# Patient Record
Sex: FEMALE | Race: WHITE | HISPANIC OR LATINO | Employment: UNEMPLOYED | URBAN - METROPOLITAN AREA
[De-identification: names, ages, dates, MRNs, and addresses within clinical notes are randomized per-mention and may not be internally consistent; named-entity substitution may affect disease eponyms.]

---

## 2022-05-17 ENCOUNTER — GYNECOLOGY VISIT (OUTPATIENT)
Dept: OBGYN | Facility: CLINIC | Age: 22
End: 2022-05-17

## 2022-05-17 VITALS
HEIGHT: 65 IN | BODY MASS INDEX: 26.49 KG/M2 | WEIGHT: 159 LBS | SYSTOLIC BLOOD PRESSURE: 108 MMHG | DIASTOLIC BLOOD PRESSURE: 64 MMHG

## 2022-05-17 DIAGNOSIS — Z32.01 POSITIVE PREGNANCY TEST: ICD-10-CM

## 2022-05-17 DIAGNOSIS — N93.8 DUB (DYSFUNCTIONAL UTERINE BLEEDING): ICD-10-CM

## 2022-05-17 LAB
INT CON NEG: POSITIVE
INT CON POS: POSITIVE
POC URINE PREGNANCY TEST: POSITIVE

## 2022-05-17 PROCEDURE — 99203 OFFICE O/P NEW LOW 30 MIN: CPT | Mod: 25 | Performed by: OBSTETRICS & GYNECOLOGY

## 2022-05-17 PROCEDURE — 81025 URINE PREGNANCY TEST: CPT | Performed by: OBSTETRICS & GYNECOLOGY

## 2022-05-17 PROCEDURE — 76830 TRANSVAGINAL US NON-OB: CPT | Mod: TC | Performed by: OBSTETRICS & GYNECOLOGY

## 2022-05-17 NOTE — PROGRESS NOTES
"CC: Confirmation of Pregnancy    HPI: Pt is a 23 yo   lmp 3/10/22 who presents for evaluation of amenorrhea.  A urine pregnancy test was positive.  She complains of some vaginal spotting.  She denies pelvic pain.  She notes nausea and vomiting.      History reviewed. No pertinent past medical history.    History reviewed. No pertinent surgical history.    No current outpatient medications on file.    Patient has no known allergies.    History reviewed. No pertinent family history.        OB History    Para Term  AB Living   1 0 0 0 0 0   SAB IAB Ectopic Molar Multiple Live Births   0 0 0 0 0 0       ROS: negative for dizziness, SOB, chest pain, palpitations, dysuria, vaginal discharge.    /64 (BP Location: Left arm, Patient Position: Sitting, BP Cuff Size: Adult)   Ht 1.65 m (5' 4.96\")   Wt 72.1 kg (159 lb)     GENERAL: Alert, in no apparent distress  PSYCHIATRIC: Appropriate affect, intact insight and judgement.  ABDOMEN: Soft, nontender, nondistended.  No palpable masses. No hepatosplenomegaly.   No rebound or guarding.  No inguinal lymphadenopathy.  BACK: No CVA tenderness  EXTREMITIES: No edema, no calf tenderness.    GENITOURINARY:  Normal external genitalia, no lesions.  Normal urethral meatus, no masses or tenderness.  Normal bladder without fullness or masses.  Vagina well estrogenized, no vaginal discharge or lesions.  No blood noted.  Cervix normal length, nontender.  A small ectropion is present.  No active bleeding is noted.  Uterus 10 weeks,  nontender.  Adnexa nontender, no masses.  Normal anus and perineum.      TRANSVAGINAL ULTRASOUND - performed and interpreted by me    Single gestational sac present.  Yolk sac present.     [place] intrauterine pregnancy with CRL measuring 3.14 cm, c/w 10+0/7 weeks EGA, positive fetal cardiac activity noted.  Fhts 164 bpm.  EDC 22.     No fluid in cul de sac.    Ovaries and cervix appear grossly normal. Cervical length = 3.27 " rowena.        ASSESSMENT/PLAN:  DUB visit - [place] IUP at 10+0/7 wks. TRISTEN will be 12/15/2022 by LMP consistent with ultrasound today.  Prenatal Vitamins.  F/U for NOB appointment 2 weeks.

## 2022-05-26 ENCOUNTER — APPOINTMENT (OUTPATIENT)
Dept: OBGYN | Facility: CLINIC | Age: 22
End: 2022-05-26

## 2022-06-06 ENCOUNTER — HOSPITAL ENCOUNTER (OUTPATIENT)
Dept: LAB | Facility: MEDICAL CENTER | Age: 22
End: 2022-06-06
Attending: NURSE PRACTITIONER
Payer: COMMERCIAL

## 2022-06-06 ENCOUNTER — HOSPITAL ENCOUNTER (OUTPATIENT)
Facility: MEDICAL CENTER | Age: 22
End: 2022-06-06
Attending: NURSE PRACTITIONER
Payer: COMMERCIAL

## 2022-06-06 ENCOUNTER — INITIAL PRENATAL (OUTPATIENT)
Dept: OBGYN | Facility: CLINIC | Age: 22
End: 2022-06-06

## 2022-06-06 VITALS
SYSTOLIC BLOOD PRESSURE: 102 MMHG | BODY MASS INDEX: 26.99 KG/M2 | WEIGHT: 162 LBS | HEIGHT: 65 IN | DIASTOLIC BLOOD PRESSURE: 60 MMHG

## 2022-06-06 DIAGNOSIS — Z34.01 SUPERVISION OF NORMAL FIRST PREGNANCY IN FIRST TRIMESTER: Primary | ICD-10-CM

## 2022-06-06 DIAGNOSIS — Z34.01 SUPERVISION OF NORMAL FIRST PREGNANCY IN FIRST TRIMESTER: ICD-10-CM

## 2022-06-06 DIAGNOSIS — O23.41 UTI IN PREGNANCY, ANTEPARTUM, FIRST TRIMESTER: ICD-10-CM

## 2022-06-06 DIAGNOSIS — O23.41 URINARY TRACT INFECTION IN MOTHER DURING FIRST TRIMESTER OF PREGNANCY: ICD-10-CM

## 2022-06-06 LAB
ABO GROUP BLD: NORMAL
AMORPH CRY #/AREA URNS HPF: PRESENT /HPF
AMPHET UR QL SCN: NEGATIVE
APPEARANCE UR: ABNORMAL
APPEARANCE UR: NORMAL
BACTERIA #/AREA URNS HPF: ABNORMAL /HPF
BARBITURATES UR QL SCN: NEGATIVE
BASOPHILS # BLD AUTO: 0.5 % (ref 0–1.8)
BASOPHILS # BLD: 0.05 K/UL (ref 0–0.12)
BENZODIAZ UR QL SCN: NEGATIVE
BILIRUB UR QL STRIP.AUTO: NEGATIVE
BILIRUB UR STRIP-MCNC: NORMAL MG/DL
BLD GP AB SCN SERPL QL: NORMAL
BZE UR QL SCN: NEGATIVE
CANNABINOIDS UR QL SCN: NEGATIVE
COLOR UR AUTO: NORMAL
COLOR UR: YELLOW
EOSINOPHIL # BLD AUTO: 0.15 K/UL (ref 0–0.51)
EOSINOPHIL NFR BLD: 1.5 % (ref 0–6.9)
EPI CELLS #/AREA URNS HPF: ABNORMAL /HPF
ERYTHROCYTE [DISTWIDTH] IN BLOOD BY AUTOMATED COUNT: 47 FL (ref 35.9–50)
GLUCOSE UR STRIP.AUTO-MCNC: NEGATIVE MG/DL
GLUCOSE UR STRIP.AUTO-MCNC: NEGATIVE MG/DL
HBV SURFACE AG SER QL: ABNORMAL
HCT VFR BLD AUTO: 38.7 % (ref 37–47)
HGB BLD-MCNC: 12.7 G/DL (ref 12–16)
HIV 1+2 AB+HIV1 P24 AG SERPL QL IA: NORMAL
HYALINE CASTS #/AREA URNS LPF: ABNORMAL /LPF
IMM GRANULOCYTES # BLD AUTO: 0.04 K/UL (ref 0–0.11)
IMM GRANULOCYTES NFR BLD AUTO: 0.4 % (ref 0–0.9)
KETONES UR STRIP.AUTO-MCNC: NEGATIVE MG/DL
KETONES UR STRIP.AUTO-MCNC: NEGATIVE MG/DL
LEUKOCYTE ESTERASE UR QL STRIP.AUTO: NEGATIVE
LEUKOCYTE ESTERASE UR QL STRIP.AUTO: NEGATIVE
LYMPHOCYTES # BLD AUTO: 2.67 K/UL (ref 1–4.8)
LYMPHOCYTES NFR BLD: 26.2 % (ref 22–41)
MCH RBC QN AUTO: 31 PG (ref 27–33)
MCHC RBC AUTO-ENTMCNC: 32.8 G/DL (ref 33.6–35)
MCV RBC AUTO: 94.4 FL (ref 81.4–97.8)
METHADONE UR QL SCN: NEGATIVE
MICRO URNS: ABNORMAL
MONOCYTES # BLD AUTO: 0.62 K/UL (ref 0–0.85)
MONOCYTES NFR BLD AUTO: 6.1 % (ref 0–13.4)
NEUTROPHILS # BLD AUTO: 6.65 K/UL (ref 2–7.15)
NEUTROPHILS NFR BLD: 65.3 % (ref 44–72)
NITRITE UR QL STRIP.AUTO: NEGATIVE
NITRITE UR QL STRIP.AUTO: NEGATIVE
NRBC # BLD AUTO: 0 K/UL
NRBC BLD-RTO: 0 /100 WBC
OPIATES UR QL SCN: NEGATIVE
OXYCODONE UR QL SCN: NEGATIVE
PCP UR QL SCN: NEGATIVE
PH UR STRIP.AUTO: 5 [PH] (ref 5–8)
PH UR STRIP.AUTO: 5.5 [PH] (ref 5–8)
PLATELET # BLD AUTO: 224 K/UL (ref 164–446)
PMV BLD AUTO: 13.1 FL (ref 9–12.9)
PROPOXYPH UR QL SCN: NEGATIVE
PROT UR QL STRIP: NEGATIVE MG/DL
PROT UR QL STRIP: NEGATIVE MG/DL
RBC # BLD AUTO: 4.1 M/UL (ref 4.2–5.4)
RBC # URNS HPF: ABNORMAL /HPF
RBC UR QL AUTO: ABNORMAL
RBC UR QL AUTO: NORMAL
RH BLD: NORMAL
RUBV AB SER QL: 92.5 IU/ML
SP GR UR STRIP.AUTO: >=1.03
SP GR UR STRIP.AUTO: >=1.03
T PALLIDUM AB SER QL IA: ABNORMAL
UROBILINOGEN UR STRIP-MCNC: NORMAL MG/DL
UROBILINOGEN UR STRIP.AUTO-MCNC: 0.2 MG/DL
WBC # BLD AUTO: 10.2 K/UL (ref 4.8–10.8)
WBC #/AREA URNS HPF: ABNORMAL /HPF

## 2022-06-06 PROCEDURE — 0500F INITIAL PRENATAL CARE VISIT: CPT | Performed by: NURSE PRACTITIONER

## 2022-06-06 PROCEDURE — 81002 URINALYSIS NONAUTO W/O SCOPE: CPT | Performed by: NURSE PRACTITIONER

## 2022-06-06 PROCEDURE — 8198 PREG CTR PKG RATE (SYSTEM): Performed by: NURSE PRACTITIONER

## 2022-06-06 RX ORDER — FOLIC ACID 1 MG/1
1 TABLET ORAL DAILY
COMMUNITY
End: 2022-08-30

## 2022-06-06 RX ORDER — NITROFURANTOIN 25; 75 MG/1; MG/1
100 CAPSULE ORAL 2 TIMES DAILY
Qty: 14 CAPSULE | Refills: 0 | Status: SHIPPED | OUTPATIENT
Start: 2022-06-06 | End: 2022-06-13

## 2022-06-06 ASSESSMENT — EDINBURGH POSTNATAL DEPRESSION SCALE (EPDS)
I HAVE BEEN ANXIOUS OR WORRIED FOR NO GOOD REASON: YES, SOMETIMES
THINGS HAVE BEEN GETTING ON TOP OF ME: NO, MOST OF THE TIME I HAVE COPED QUITE WELL
I HAVE BEEN ABLE TO LAUGH AND SEE THE FUNNY SIDE OF THINGS: AS MUCH AS I ALWAYS COULD
TOTAL SCORE: 5
I HAVE BLAMED MYSELF UNNECESSARILY WHEN THINGS WENT WRONG: NO, NEVER
I HAVE BEEN SO UNHAPPY THAT I HAVE HAD DIFFICULTY SLEEPING: NOT AT ALL
THE THOUGHT OF HARMING MYSELF HAS OCCURRED TO ME: NEVER
I HAVE FELT SCARED OR PANICKY FOR NO GOOD REASON: YES, SOMETIMES
I HAVE BEEN SO UNHAPPY THAT I HAVE BEEN CRYING: NO, NEVER
I HAVE FELT SAD OR MISERABLE: NO, NOT AT ALL
I HAVE LOOKED FORWARD WITH ENJOYMENT TO THINGS: AS MUCH AS I EVER DID

## 2022-06-06 NOTE — PROGRESS NOTES
NOB today. DUB done 5/17/22  Last pap: fist one today  Phone # [phone]  Pharmacy confirmed  On PNV  Cystic Fibrosis test offered.

## 2022-06-07 DIAGNOSIS — Z34.01 SUPERVISION OF NORMAL FIRST PREGNANCY IN FIRST TRIMESTER: ICD-10-CM

## 2022-06-07 LAB
C TRACH DNA GENITAL QL NAA+PROBE: NEGATIVE
CYTOLOGY REG CYTOL: NORMAL
N GONORRHOEA DNA GENITAL QL NAA+PROBE: NEGATIVE
SPECIMEN SOURCE: NORMAL

## 2022-06-14 ENCOUNTER — TELEPHONE (OUTPATIENT)
Dept: OBGYN | Facility: CLINIC | Age: 22
End: 2022-06-14

## 2022-06-14 NOTE — TELEPHONE ENCOUNTER
----- Message from SIMBA Chow sent at 6/14/2022  8:14 AM PDT -----  LSIL and +HPV > repeat pap in 1yr      6/14/2022 1441 called but mobile number no longer in service. 1442 Called pt's home # and no answer and unable to leave VM. VM not set up yet. Will try again later  6/16/2022 1422 Called pt's home # but no answer and unable to leave VM. VM not set up yet. Will try again later  6/21/2022 1526 Called pt's home # but no answer and unable to leave VM. VM not set up yet.   Will send letter today for pt to contact our office. ROWAN placed in chat to notify pt on next visit schedule on 7/5/2022.

## 2022-06-19 LAB
HPV HR 12 DNA CVX QL NAA+PROBE: POSITIVE
HPV16 DNA SPEC QL NAA+PROBE: NEGATIVE
HPV18 DNA SPEC QL NAA+PROBE: NEGATIVE
SPECIMEN SOURCE: ABNORMAL

## 2022-07-05 PROBLEM — R87.612 LGSIL ON PAP SMEAR OF CERVIX: Status: ACTIVE | Noted: 2022-07-05

## 2022-07-05 PROBLEM — Z34.02 SUPERVISION OF NORMAL FIRST PREGNANCY IN SECOND TRIMESTER: Status: ACTIVE | Noted: 2022-06-06

## 2022-08-01 ENCOUNTER — ROUTINE PRENATAL (OUTPATIENT)
Dept: OBGYN | Facility: CLINIC | Age: 22
End: 2022-08-01

## 2022-08-01 ENCOUNTER — TELEPHONE (OUTPATIENT)
Dept: OBGYN | Facility: CLINIC | Age: 22
End: 2022-08-01

## 2022-08-01 VITALS — WEIGHT: 171 LBS | DIASTOLIC BLOOD PRESSURE: 60 MMHG | SYSTOLIC BLOOD PRESSURE: 100 MMHG | BODY MASS INDEX: 28.49 KG/M2

## 2022-08-01 DIAGNOSIS — Z34.02 SUPERVISION OF NORMAL FIRST PREGNANCY IN SECOND TRIMESTER: ICD-10-CM

## 2022-08-01 PROCEDURE — 90040 PR PRENATAL FOLLOW UP: CPT

## 2022-08-01 NOTE — PROGRESS NOTES
Pt here today for OB follow up  Pt states no complaints  Reports +FM, declines VB, LOF, Cramping/ contractions  Good # [phone]  Pharmacy Confirmed.

## 2022-08-01 NOTE — TELEPHONE ENCOUNTER
08/01/2022 0925 Pt called stating she has an appt today @ 10:45 am. Stated she has been feeling sick for the past 2 weeks with runny nose and nasal congestion. She is now having a sore throat and cough  x 3 days. Pt denies having fever, body aching, or headaches. I asked pt if she has had Covid test done. Pt stated no.  I told pt that I was going to consult with Crissy Ulrich C.N.M. to see if we are able to see her today or if she needs to be rescheduled. I told pt I will give her a call back.    I consulted with Crissy Zambrano C.N.M. and informed her the above. Per Crissy pt can be seen today at her scheduled time pt needs to wear a mask at all times.      0938 I called pt back and informed her the provider's instructions.

## 2022-08-01 NOTE — PROGRESS NOTES
S:  Ana here with FOB for routine prenatal follow up.  Reports good FM.  Denies VB, LOF, RUCs, or vaginal DC.  Has had a cold x2wks, nasal congestion and sore throat. No fevers. O/w no complaints or concerns.     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  Reviewed labs, w pt.  Ultrasound pending        2.  Questions answered.          3.  Encouraged adequate water intake        4.  Anticipatory guidance        5.  F/u 4 wks and PRN    Crissy Ulrich C.N.M.

## 2022-08-15 ENCOUNTER — APPOINTMENT (OUTPATIENT)
Dept: RADIOLOGY | Facility: IMAGING CENTER | Age: 22
End: 2022-08-15
Attending: NURSE PRACTITIONER

## 2022-08-15 DIAGNOSIS — Z34.01 SUPERVISION OF NORMAL FIRST PREGNANCY IN FIRST TRIMESTER: ICD-10-CM

## 2022-08-15 PROCEDURE — 76805 OB US >/= 14 WKS SNGL FETUS: CPT | Mod: TC | Performed by: RADIOLOGY

## 2022-08-15 PROCEDURE — 76817 TRANSVAGINAL US OBSTETRIC: CPT | Mod: TC | Performed by: RADIOLOGY

## 2022-08-30 ENCOUNTER — ROUTINE PRENATAL (OUTPATIENT)
Dept: OBGYN | Facility: CLINIC | Age: 22
End: 2022-08-30

## 2022-08-30 VITALS — SYSTOLIC BLOOD PRESSURE: 102 MMHG | DIASTOLIC BLOOD PRESSURE: 56 MMHG | BODY MASS INDEX: 30.32 KG/M2 | WEIGHT: 182 LBS

## 2022-08-30 DIAGNOSIS — Z34.02 SUPERVISION OF NORMAL FIRST PREGNANCY IN SECOND TRIMESTER: ICD-10-CM

## 2022-08-30 PROCEDURE — 90040 PR PRENATAL FOLLOW UP: CPT

## 2022-08-30 NOTE — PROGRESS NOTES
Pt here today for OB follow up  Reports +FM  WT: 182.0 lb  BP: 102/56  Preferred pharmacy verified with pt.  Pt states no complaints or concerns today  3rd trimester labs ordered today, instructions given   Good # [phone]

## 2022-08-30 NOTE — PROGRESS NOTES
S:  Ana here with male partner for routine prenatal follow up.  Reports good FM.  Denies VB, RUCs, LOF or vaginal DC.      O:    Vitals:    22 1111   BP: 102/56   Weight: 82.6 kg (182 lb)   See flow sheet.    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 Questions answered.           2. Encouraged adequate water intake        3.   labor precautions reviewed.        4.  F/u 4 wks and PRN        5.  28wk labs ordered and instructions provided, will wait until 26wks to collect  Orders Placed This Encounter    GLUCOSE 1HR GESTATIONAL    CBC WITHOUT DIFFERENTIAL    T.PALLIDUM AB EIA      LUDIN FernandezN.M.

## 2022-09-20 ENCOUNTER — HOSPITAL ENCOUNTER (OUTPATIENT)
Dept: LAB | Facility: MEDICAL CENTER | Age: 22
End: 2022-09-20
Payer: COMMERCIAL

## 2022-09-20 DIAGNOSIS — Z34.02 SUPERVISION OF NORMAL FIRST PREGNANCY IN SECOND TRIMESTER: ICD-10-CM

## 2022-09-20 LAB
ERYTHROCYTE [DISTWIDTH] IN BLOOD BY AUTOMATED COUNT: 48.1 FL (ref 35.9–50)
GLUCOSE 1H P 50 G GLC PO SERPL-MCNC: 154 MG/DL (ref 70–139)
HCT VFR BLD AUTO: 32.8 % (ref 37–47)
HGB BLD-MCNC: 11.3 G/DL (ref 12–16)
MCH RBC QN AUTO: 33 PG (ref 27–33)
MCHC RBC AUTO-ENTMCNC: 34.5 G/DL (ref 33.6–35)
MCV RBC AUTO: 95.9 FL (ref 81.4–97.8)
PLATELET # BLD AUTO: 275 K/UL (ref 164–446)
PMV BLD AUTO: 12.1 FL (ref 9–12.9)
RBC # BLD AUTO: 3.42 M/UL (ref 4.2–5.4)
T PALLIDUM AB SER QL IA: NORMAL
WBC # BLD AUTO: 11.8 K/UL (ref 4.8–10.8)

## 2022-09-21 ENCOUNTER — TELEPHONE (OUTPATIENT)
Dept: OBGYN | Facility: CLINIC | Age: 22
End: 2022-09-21

## 2022-09-21 DIAGNOSIS — R73.09 ELEVATED GLUCOSE TOLERANCE TEST: ICD-10-CM

## 2022-09-21 NOTE — LETTER
09/21/22          Ana English por favor tenemos shaquille informacion pendiente con respecto a skaggs cuidado medico.  Shaquille de las enfermeras está disponible para hablar con usted de Lunes a Viernes de 8 a.m. - 12 p.m. o de 1 p.m. - 4:30 p.m.    Raoos por favor al [phone]; y sintia por skaggs pronta atención.          Sinceramente,    Marianela Alegria R.N.    Firmado Electrónicamente

## 2022-09-27 ENCOUNTER — ROUTINE PRENATAL (OUTPATIENT)
Dept: OBGYN | Facility: CLINIC | Age: 22
End: 2022-09-27

## 2022-09-27 VITALS — DIASTOLIC BLOOD PRESSURE: 72 MMHG | WEIGHT: 190 LBS | BODY MASS INDEX: 31.66 KG/M2 | SYSTOLIC BLOOD PRESSURE: 116 MMHG

## 2022-09-27 DIAGNOSIS — Z34.03 SUPERVISION OF NORMAL FIRST PREGNANCY IN THIRD TRIMESTER: ICD-10-CM

## 2022-09-27 PROCEDURE — 90686 IIV4 VACC NO PRSV 0.5 ML IM: CPT | Performed by: NURSE PRACTITIONER

## 2022-09-27 PROCEDURE — 90471 IMMUNIZATION ADMIN: CPT | Performed by: NURSE PRACTITIONER

## 2022-09-27 PROCEDURE — 90040 PR PRENATAL FOLLOW UP: CPT | Performed by: NURSE PRACTITIONER

## 2022-09-27 PROCEDURE — 90472 IMMUNIZATION ADMIN EACH ADD: CPT | Performed by: NURSE PRACTITIONER

## 2022-09-27 PROCEDURE — 90715 TDAP VACCINE 7 YRS/> IM: CPT | Performed by: NURSE PRACTITIONER

## 2022-09-27 NOTE — PROGRESS NOTES
SUBJECTIVE:  Pt is a 22 y.o.   at 28w5d  gestation. Presents today for follow-up prenatal care. Reports no issues at this time.  Reports good  fetal movement. Denies regular cramping/contractions, bleeding or leaking of fluid. Denies dysuria, headaches, N/V. Generally feels well today.     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

## 2022-09-27 NOTE — PROGRESS NOTES
OB follow up   +FM, Denies VB, LOF or UC's.  Phone # [phone]  Preferred pharmacy confirmed  Kick count sheet given today.  Desires TDap  Desires Flu Shot  Declines BTL    TDap given to patient on R Deltoid. Screening checklist reviewed with patient. VIS given.    Flu Shot given to patient on L Deltoid. Screening checklist reviewed with patient. VIS given.

## 2022-09-27 NOTE — LETTER
Cuente los Movimientos de skaggs Bebé  Otro paso importante para la zev de skaggs bebé    Ana Alonso Sumnera     [facility]            Dept: [phone]    ¿Cuántas semanas tiene de embarazo? 28w5d    Fecha cuando tiene que comenzar a contar el movimiento: 9/27/2022                  Quentin debe usar avel diagrama    Shaquille manera en que skaggs doctor puede controlar a zev de skaggs bebé es sabiendo cuantas veces se mueve skaggs bebé en el útero, o por medio de las “pataditas”.  Usted podrá ayudarle a skaggs médico al usar cada día el siguiente diagrama.    Cada día, usted debe prestar atención a cuantas horas le lleva a skaggs bebé moverse 10 veces.  Comience a contar en la mañana, lo antes posible después de haberse levantado.    · Primeramente, escriba la hora en que se mueve skaggs bebé, hasta llegar a 10 veces.  · Colóquele un check o palomita a cada cuadrito cada vez que skaggs bebé se mueva hasta que complete 10 veces.  · Escriba la hora cuando termine de contar 10 veces en la última columna.  · Sume el total del tiempo que le llevó contar los 10 movimientos.  · Finalmente, complete el cuadrito de cuantas horas le llevó hacerlo.    Después de atiya contado los 10 movimientos, ya no tendrá que contar los demás movimientos por el kaity del día.  A la mañana siguiente, comience a contar de nuevo cuantas veces se mueve el bebé desde el momento en que se levante.    ¿Qué tendría que considerarse un “movimiento”?  Es difícil de decirlo porque es distinto de shaquille madre a otra, y de un embarazo a otro.  Lo importante es que cuente el movimiento de la misma manera diana el transcurso de skaggs embarazo.  Si tiene preguntas adicionales, pregúntele a skaggs doctor.    ¡Cuente cuidadosamente cada día!     MUESTRA:  Semana 28    ¿Cuántas horas le ha llevado sentir 10 movimientos?        Hora de Inicio     1     2     3     4     5     6     7     8     9     10   Hora de Finlizar   Mayito. 8:20 ·  ·  ·  ·  ·  ·  ·  ·  ·  ·  11:40   Mar.                Mié.               Jue.               Vie.               Sáb.               Dom.                 IMPORTANTE:  Usted debe contactar a skaggs doctor si le lleva más de 2 horas sentir 10 movimientos de skaggs bebé.    Cada mañana, escriba la hora de inicio y comience a contar los movimientos de skaggs bebé.  Hágalo colocándole un check o palomita a cada cuadrito cada vez que sienta un movimiento de skaggs bebé.  Cuando haya sentido 10 “pataditas”, escriba la hora en que terminó de contar en la última columna.  Luego, complete en la cajita (arriba de la tavia de check o palomita) el número total de horas que le llevó hacerlo.  Asegúrese de leer completamente las instrucciones en la página anterior.

## 2022-10-07 ENCOUNTER — HOSPITAL ENCOUNTER (OUTPATIENT)
Dept: LAB | Facility: MEDICAL CENTER | Age: 22
End: 2022-10-07
Attending: NURSE PRACTITIONER
Payer: COMMERCIAL

## 2022-10-07 ENCOUNTER — HOSPITAL ENCOUNTER (OUTPATIENT)
Dept: LAB | Facility: MEDICAL CENTER | Age: 22
End: 2022-10-07
Payer: COMMERCIAL

## 2022-10-07 DIAGNOSIS — R73.09 ELEVATED GLUCOSE TOLERANCE TEST: ICD-10-CM

## 2022-10-07 DIAGNOSIS — Z34.03 SUPERVISION OF NORMAL FIRST PREGNANCY IN THIRD TRIMESTER: ICD-10-CM

## 2022-10-07 LAB
GLUCOSE 1H P CHAL SERPL-MCNC: 203 MG/DL (ref 65–180)
GLUCOSE 2H P CHAL SERPL-MCNC: 135 MG/DL (ref 65–155)
GLUCOSE 3H P CHAL SERPL-MCNC: 115 MG/DL (ref 65–140)
GLUCOSE BS SERPL-MCNC: 75 MG/DL (ref 65–95)

## 2022-10-09 LAB
BACTERIA UR CULT: NORMAL
SIGNIFICANT IND 70042: NORMAL
SITE SITE: NORMAL
SOURCE SOURCE: NORMAL

## 2022-10-11 ENCOUNTER — ROUTINE PRENATAL (OUTPATIENT)
Dept: OBGYN | Facility: CLINIC | Age: 22
End: 2022-10-11

## 2022-10-11 VITALS — DIASTOLIC BLOOD PRESSURE: 60 MMHG | WEIGHT: 193.8 LBS | SYSTOLIC BLOOD PRESSURE: 102 MMHG | BODY MASS INDEX: 32.29 KG/M2

## 2022-10-11 DIAGNOSIS — O44.43 LOW LYING PLACENTA NOS OR WITHOUT HEMORRHAGE, THIRD TRIMESTER: ICD-10-CM

## 2022-10-11 DIAGNOSIS — R73.09 ELEVATED GLUCOSE TOLERANCE TEST: ICD-10-CM

## 2022-10-11 PROBLEM — O23.41 URINARY TRACT INFECTION IN MOTHER DURING FIRST TRIMESTER OF PREGNANCY: Status: RESOLVED | Noted: 2022-06-06 | Resolved: 2022-10-11

## 2022-10-11 PROCEDURE — 90040 PR PRENATAL FOLLOW UP: CPT | Performed by: PHYSICIAN ASSISTANT

## 2022-10-11 NOTE — PROGRESS NOTES
Pt has no complaints with cramping, UCs, Vb, LOF. +FM. 3hr GTT, CBC, T pall wnl, but one elevated value on 3hr GTT - pt notified and encouraged to incr exercise, improve diet with less simple sugars. US showed low lying placenta at 20 wk - will have pt make f/u for next few weeks. RTC 2 wk or sooner prn.

## 2022-10-21 ENCOUNTER — APPOINTMENT (OUTPATIENT)
Dept: RADIOLOGY | Facility: IMAGING CENTER | Age: 22
End: 2022-10-21
Attending: PHYSICIAN ASSISTANT

## 2022-10-21 DIAGNOSIS — O44.43 LOW LYING PLACENTA NOS OR WITHOUT HEMORRHAGE, THIRD TRIMESTER: ICD-10-CM

## 2022-10-21 PROCEDURE — 76817 TRANSVAGINAL US OBSTETRIC: CPT | Mod: TC | Performed by: RADIOLOGY

## 2022-10-31 ENCOUNTER — ROUTINE PRENATAL (OUTPATIENT)
Dept: OBGYN | Facility: CLINIC | Age: 22
End: 2022-10-31

## 2022-10-31 VITALS — DIASTOLIC BLOOD PRESSURE: 68 MMHG | BODY MASS INDEX: 33.16 KG/M2 | SYSTOLIC BLOOD PRESSURE: 118 MMHG | WEIGHT: 199 LBS

## 2022-10-31 DIAGNOSIS — Z34.83 ENCOUNTER FOR SUPERVISION OF OTHER NORMAL PREGNANCY IN THIRD TRIMESTER: ICD-10-CM

## 2022-10-31 PROCEDURE — 0502F SUBSEQUENT PRENATAL CARE: CPT

## 2022-10-31 NOTE — PROGRESS NOTES
S:   Ana is a 22 y.o.  at 33w4d. Her TRISTEN is 12/15/2022, by Last Menstrual Period. She is here with her partner for routine OB follow up.  Reports positive FM.  Denies VB, LOF, RUCs or vaginal DC.     O:    Vitals:    10/31/22 1518   BP: 118/68   Weight: 199 lb     FH: 33 cm  FHT: 148 BPM    F/U ultrasound for placental location: No evidence of placenta previa. Placenta is located 3 cm from the cervix and is located posteriorly.    See flow sheet.    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 Reviewed indication/treatment plan if positive.   Continue FKCs.    Questions answered. Anticipatory guidance.   Encourage adequate water intake.  Reviewed  labor and return precautions - patient verbalized understanding.  Reviewed placental location with patient - okay for vaginal delivery  F/u 2 wks and PRN    No orders of the defined types were placed in this encounter.      Janet Hinds C.N.M.    Ipad  utilized: Moy #[id number]

## 2022-10-31 NOTE — NON-PROVIDER
Pt here today for OB follow up  Pt denies cramping, bleeding, or discharge   Reports +  Good # [phone]  Pharmacy Confirmed.

## 2022-11-21 ENCOUNTER — HOSPITAL ENCOUNTER (OUTPATIENT)
Facility: MEDICAL CENTER | Age: 22
End: 2022-11-21
Attending: NURSE PRACTITIONER
Payer: COMMERCIAL

## 2022-11-21 ENCOUNTER — ROUTINE PRENATAL (OUTPATIENT)
Dept: OBGYN | Facility: CLINIC | Age: 22
End: 2022-11-21

## 2022-11-21 VITALS — WEIGHT: 207.6 LBS | BODY MASS INDEX: 34.59 KG/M2 | DIASTOLIC BLOOD PRESSURE: 68 MMHG | SYSTOLIC BLOOD PRESSURE: 108 MMHG

## 2022-11-21 DIAGNOSIS — Z34.83 ENCOUNTER FOR SUPERVISION OF OTHER NORMAL PREGNANCY IN THIRD TRIMESTER: Primary | ICD-10-CM

## 2022-11-21 DIAGNOSIS — Z34.83 ENCOUNTER FOR SUPERVISION OF OTHER NORMAL PREGNANCY IN THIRD TRIMESTER: ICD-10-CM

## 2022-11-21 PROCEDURE — 0502F SUBSEQUENT PRENATAL CARE: CPT | Performed by: NURSE PRACTITIONER

## 2022-11-21 NOTE — PROGRESS NOTES
S) Pt is a 22 y.o.   at 36w4d  gestation. Routine prenatal care today. No concerns today. All caught up with labs and US. GBS today. SVE and IOL referral at 38 weeks. Labor precautions reviewed, all questions answered.      Fetal movement Normal  Cramping no  VB no  LOF no   Denies dysuria. Generally feels well today. Good self-care activities identified. Denies headaches, swelling, visual changes, or epigastric pain .     O) /68   Wt 207 lb 9.6 oz         Labs:       PNL: WNL       GCT: 154, but 3 hour WNL        AFP: Not Examined       GBS: Collected today       Pertinent ultrasound -        8/15/2022- Survey WNL, TAMIKO 16.85cm, c/w prev dating. EFW 52%. Low lying placenta noted, needs repeat US  10/21/2022- Recheck placenta- No abnormalities noted, placenta 3cm from os.    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 Fetal movements reviewed. General ed and anticipatory guidance. Nutrition/exercise/vitamin. Plans breast Plans pp contraception- unsure  Continue PNV.

## 2022-11-22 LAB — GP B STREP DNA SPEC QL NAA+PROBE: NEGATIVE

## 2022-11-28 ENCOUNTER — ROUTINE PRENATAL (OUTPATIENT)
Dept: OBGYN | Facility: CLINIC | Age: 22
End: 2022-11-28

## 2022-11-28 VITALS — SYSTOLIC BLOOD PRESSURE: 110 MMHG | BODY MASS INDEX: 34.49 KG/M2 | DIASTOLIC BLOOD PRESSURE: 64 MMHG | WEIGHT: 207 LBS

## 2022-11-28 DIAGNOSIS — Z34.03 ENCOUNTER FOR PRENATAL CARE IN THIRD TRIMESTER OF FIRST PREGNANCY: ICD-10-CM

## 2022-11-28 PROCEDURE — 0502F SUBSEQUENT PRENATAL CARE: CPT | Performed by: NURSE PRACTITIONER

## 2022-11-28 NOTE — PROGRESS NOTES
SUBJECTIVE:  Pt is a 22 y.o.   at 37w4d  gestation. Presents today for follow-up prenatal care. Reports no issues at this time.  Reports good  fetal movement. Denies regular cramping/contractions, bleeding or leaking of fluid. Denies dysuria, headaches, N/V. Generally feels well today.     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

## 2022-11-28 NOTE — PROGRESS NOTES
OB follow up   + FM, Denies VB, LOF or UC's  Phone # [phone]  Preferred pharmacy confirmed  GBS negative

## 2022-12-06 ENCOUNTER — ROUTINE PRENATAL (OUTPATIENT)
Dept: OBGYN | Facility: CLINIC | Age: 22
End: 2022-12-06

## 2022-12-06 VITALS — WEIGHT: 204 LBS | DIASTOLIC BLOOD PRESSURE: 70 MMHG | SYSTOLIC BLOOD PRESSURE: 100 MMHG | BODY MASS INDEX: 33.99 KG/M2

## 2022-12-06 DIAGNOSIS — Z34.83 ENCOUNTER FOR SUPERVISION OF OTHER NORMAL PREGNANCY IN THIRD TRIMESTER: Primary | ICD-10-CM

## 2022-12-06 PROCEDURE — 0502F SUBSEQUENT PRENATAL CARE: CPT | Performed by: NURSE PRACTITIONER

## 2022-12-06 NOTE — PROGRESS NOTES
OB follow up   + fetal movement.  No VB, LOF or UC's.  Phone # [phone]  Preferred pharmacy confirmed.  GBS negative  Gel on 12/13/22 and IOL 12/14/22. Patient given information.

## 2022-12-06 NOTE — PROGRESS NOTES
S:  Pt is  at 38w5d here for routine OB follow up.  No concerns today.  No ED or hospital visits since last seen. Reports good FM.  Denies VB, LOF, RUCs, or vaginal DC.     O:  See above vitals        Fetal position: cephalic        GBS negative  -- reviewed w pt.      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  Anticipatory guidance given         2.  Labor precautions given.  Instructions given on where to go.  Pt receptive to education.         3.  IOL scheduled for outpt gel on  and inpt on         4.  Questions answered.         5.  Encouraged adequate water intake, walking 30-60 min daily, pelvic rocking, birthing ball

## 2022-12-17 ENCOUNTER — HOSPITAL ENCOUNTER (INPATIENT)
Facility: MEDICAL CENTER | Age: 22
LOS: 3 days | End: 2022-12-20
Attending: OBSTETRICS & GYNECOLOGY | Admitting: OBSTETRICS & GYNECOLOGY
Payer: MEDICAID

## 2022-12-17 LAB
BASOPHILS # BLD AUTO: 0.3 % (ref 0–1.8)
BASOPHILS # BLD: 0.03 K/UL (ref 0–0.12)
EOSINOPHIL # BLD AUTO: 0.03 K/UL (ref 0–0.51)
EOSINOPHIL NFR BLD: 0.3 % (ref 0–6.9)
ERYTHROCYTE [DISTWIDTH] IN BLOOD BY AUTOMATED COUNT: 46.3 FL (ref 35.9–50)
HCT VFR BLD AUTO: 32.2 % (ref 37–47)
HGB BLD-MCNC: 10.5 G/DL (ref 12–16)
HOLDING TUBE BB 8507: NORMAL
IMM GRANULOCYTES # BLD AUTO: 0.07 K/UL (ref 0–0.11)
IMM GRANULOCYTES NFR BLD AUTO: 0.7 % (ref 0–0.9)
LYMPHOCYTES # BLD AUTO: 2.11 K/UL (ref 1–4.8)
LYMPHOCYTES NFR BLD: 22 % (ref 22–41)
MCH RBC QN AUTO: 28.9 PG (ref 27–33)
MCHC RBC AUTO-ENTMCNC: 32.6 G/DL (ref 33.6–35)
MCV RBC AUTO: 88.7 FL (ref 81.4–97.8)
MONOCYTES # BLD AUTO: 0.66 K/UL (ref 0–0.85)
MONOCYTES NFR BLD AUTO: 6.9 % (ref 0–13.4)
NEUTROPHILS # BLD AUTO: 6.67 K/UL (ref 2–7.15)
NEUTROPHILS NFR BLD: 69.8 % (ref 44–72)
NRBC # BLD AUTO: 0 K/UL
NRBC BLD-RTO: 0 /100 WBC
PLATELET # BLD AUTO: 314 K/UL (ref 164–446)
PMV BLD AUTO: 11.9 FL (ref 9–12.9)
RBC # BLD AUTO: 3.63 M/UL (ref 4.2–5.4)
T PALLIDUM AB SER QL IA: NORMAL
WBC # BLD AUTO: 9.6 K/UL (ref 4.8–10.8)

## 2022-12-17 PROCEDURE — 86780 TREPONEMA PALLIDUM: CPT

## 2022-12-17 PROCEDURE — 700102 HCHG RX REV CODE 250 W/ 637 OVERRIDE(OP): Performed by: ADVANCED PRACTICE MIDWIFE

## 2022-12-17 PROCEDURE — 770002 HCHG ROOM/CARE - OB PRIVATE (112)

## 2022-12-17 PROCEDURE — A9270 NON-COVERED ITEM OR SERVICE: HCPCS | Performed by: ADVANCED PRACTICE MIDWIFE

## 2022-12-17 PROCEDURE — 85025 COMPLETE CBC W/AUTO DIFF WBC: CPT

## 2022-12-17 PROCEDURE — 36415 COLL VENOUS BLD VENIPUNCTURE: CPT

## 2022-12-17 RX ORDER — TERBUTALINE SULFATE 1 MG/ML
0.25 INJECTION, SOLUTION SUBCUTANEOUS
Status: DISCONTINUED | OUTPATIENT
Start: 2022-12-17 | End: 2022-12-19 | Stop reason: HOSPADM

## 2022-12-17 RX ORDER — SODIUM CHLORIDE, SODIUM LACTATE, POTASSIUM CHLORIDE, CALCIUM CHLORIDE 600; 310; 30; 20 MG/100ML; MG/100ML; MG/100ML; MG/100ML
INJECTION, SOLUTION INTRAVENOUS CONTINUOUS
Status: DISCONTINUED | OUTPATIENT
Start: 2022-12-17 | End: 2022-12-20 | Stop reason: HOSPADM

## 2022-12-17 RX ORDER — HYDROXYZINE 50 MG/1
50 TABLET, FILM COATED ORAL EVERY 6 HOURS PRN
Status: DISCONTINUED | OUTPATIENT
Start: 2022-12-17 | End: 2022-12-19 | Stop reason: HOSPADM

## 2022-12-17 RX ORDER — ONDANSETRON 4 MG/1
4 TABLET, ORALLY DISINTEGRATING ORAL EVERY 6 HOURS PRN
Status: DISCONTINUED | OUTPATIENT
Start: 2022-12-17 | End: 2022-12-19 | Stop reason: HOSPADM

## 2022-12-17 RX ORDER — ONDANSETRON 2 MG/ML
4 INJECTION INTRAMUSCULAR; INTRAVENOUS EVERY 6 HOURS PRN
Status: DISCONTINUED | OUTPATIENT
Start: 2022-12-17 | End: 2022-12-19 | Stop reason: HOSPADM

## 2022-12-17 RX ORDER — IBUPROFEN 800 MG/1
800 TABLET ORAL
Status: DISCONTINUED | OUTPATIENT
Start: 2022-12-17 | End: 2022-12-19 | Stop reason: HOSPADM

## 2022-12-17 RX ORDER — OXYTOCIN 10 [USP'U]/ML
10 INJECTION, SOLUTION INTRAMUSCULAR; INTRAVENOUS
Status: DISCONTINUED | OUTPATIENT
Start: 2022-12-17 | End: 2022-12-19 | Stop reason: HOSPADM

## 2022-12-17 RX ORDER — LIDOCAINE HYDROCHLORIDE 10 MG/ML
20 INJECTION, SOLUTION INFILTRATION; PERINEURAL
Status: DISCONTINUED | OUTPATIENT
Start: 2022-12-17 | End: 2022-12-19 | Stop reason: HOSPADM

## 2022-12-17 RX ORDER — ACETAMINOPHEN 500 MG
1000 TABLET ORAL
Status: COMPLETED | OUTPATIENT
Start: 2022-12-17 | End: 2022-12-19

## 2022-12-17 RX ADMIN — DINOPROSTONE 10 MG: 10 INSERT VAGINAL at 23:49

## 2022-12-17 ASSESSMENT — COPD QUESTIONNAIRES
DURING THE PAST 4 WEEKS HOW MUCH DID YOU FEEL SHORT OF BREATH: NONE/LITTLE OF THE TIME
IN THE PAST 12 MONTHS DO YOU DO LESS THAN YOU USED TO BECAUSE OF YOUR BREATHING PROBLEMS: DISAGREE/UNSURE
HAVE YOU SMOKED AT LEAST 100 CIGARETTES IN YOUR ENTIRE LIFE: NO/DON'T KNOW
DO YOU EVER COUGH UP ANY MUCUS OR PHLEGM?: NO/ONLY WITH OCCASIONAL COLDS OR INFECTIONS

## 2022-12-17 ASSESSMENT — PATIENT HEALTH QUESTIONNAIRE - PHQ9
2. FEELING DOWN, DEPRESSED, IRRITABLE, OR HOPELESS: NOT AT ALL
SUM OF ALL RESPONSES TO PHQ9 QUESTIONS 1 AND 2: 0
1. LITTLE INTEREST OR PLEASURE IN DOING THINGS: NOT AT ALL

## 2022-12-17 ASSESSMENT — LIFESTYLE VARIABLES
CONSUMPTION TOTAL: INCOMPLETE
TOTAL SCORE: 0
TOTAL SCORE: 0
EVER FELT BAD OR GUILTY ABOUT YOUR DRINKING: NO
HAVE YOU EVER FELT YOU SHOULD CUT DOWN ON YOUR DRINKING: NO
EVER HAD A DRINK FIRST THING IN THE MORNING TO STEADY YOUR NERVES TO GET RID OF A HANGOVER: NO
ALCOHOL_USE: NO
HAVE PEOPLE ANNOYED YOU BY CRITICIZING YOUR DRINKING: NO
EVER_SMOKED: NEVER
TOTAL SCORE: 0

## 2022-12-18 ENCOUNTER — ANESTHESIA EVENT (OUTPATIENT)
Dept: ANESTHESIOLOGY | Facility: MEDICAL CENTER | Age: 22
End: 2022-12-18
Payer: MEDICAID

## 2022-12-18 ENCOUNTER — ANESTHESIA (OUTPATIENT)
Dept: ANESTHESIOLOGY | Facility: MEDICAL CENTER | Age: 22
End: 2022-12-18
Payer: MEDICAID

## 2022-12-18 PROCEDURE — 700111 HCHG RX REV CODE 636 W/ 250 OVERRIDE (IP)

## 2022-12-18 PROCEDURE — 700101 HCHG RX REV CODE 250: Performed by: INTERNAL MEDICINE

## 2022-12-18 PROCEDURE — 770002 HCHG ROOM/CARE - OB PRIVATE (112)

## 2022-12-18 PROCEDURE — 700105 HCHG RX REV CODE 258: Performed by: ADVANCED PRACTICE MIDWIFE

## 2022-12-18 PROCEDURE — 01967 NEURAXL LBR ANES VAG DLVR: CPT | Performed by: INTERNAL MEDICINE

## 2022-12-18 PROCEDURE — 700111 HCHG RX REV CODE 636 W/ 250 OVERRIDE (IP): Performed by: INTERNAL MEDICINE

## 2022-12-18 PROCEDURE — 700111 HCHG RX REV CODE 636 W/ 250 OVERRIDE (IP): Performed by: OBSTETRICS & GYNECOLOGY

## 2022-12-18 PROCEDURE — 700105 HCHG RX REV CODE 258: Performed by: OBSTETRICS & GYNECOLOGY

## 2022-12-18 PROCEDURE — 303615 HCHG EPIDURAL/SPINAL ANESTHESIA FOR LABOR

## 2022-12-18 PROCEDURE — 700111 HCHG RX REV CODE 636 W/ 250 OVERRIDE (IP): Performed by: ADVANCED PRACTICE MIDWIFE

## 2022-12-18 RX ORDER — SODIUM CHLORIDE, SODIUM LACTATE, POTASSIUM CHLORIDE, AND CALCIUM CHLORIDE .6; .31; .03; .02 G/100ML; G/100ML; G/100ML; G/100ML
1000 INJECTION, SOLUTION INTRAVENOUS
Status: DISCONTINUED | OUTPATIENT
Start: 2022-12-18 | End: 2022-12-19 | Stop reason: HOSPADM

## 2022-12-18 RX ORDER — ROPIVACAINE HYDROCHLORIDE 2 MG/ML
INJECTION, SOLUTION EPIDURAL; INFILTRATION; PERINEURAL
Status: COMPLETED
Start: 2022-12-18 | End: 2022-12-18

## 2022-12-18 RX ORDER — SODIUM CHLORIDE, SODIUM LACTATE, POTASSIUM CHLORIDE, AND CALCIUM CHLORIDE .6; .31; .03; .02 G/100ML; G/100ML; G/100ML; G/100ML
250 INJECTION, SOLUTION INTRAVENOUS PRN
Status: DISCONTINUED | OUTPATIENT
Start: 2022-12-18 | End: 2022-12-19 | Stop reason: HOSPADM

## 2022-12-18 RX ORDER — LIDOCAINE HYDROCHLORIDE AND EPINEPHRINE 15; 5 MG/ML; UG/ML
INJECTION, SOLUTION EPIDURAL
Status: COMPLETED | OUTPATIENT
Start: 2022-12-18 | End: 2022-12-18

## 2022-12-18 RX ORDER — BUPIVACAINE HYDROCHLORIDE 2.5 MG/ML
INJECTION, SOLUTION EPIDURAL; INFILTRATION; INTRACAUDAL
Status: COMPLETED | OUTPATIENT
Start: 2022-12-18 | End: 2022-12-18

## 2022-12-18 RX ORDER — ROPIVACAINE HYDROCHLORIDE 2 MG/ML
INJECTION, SOLUTION EPIDURAL; INFILTRATION; PERINEURAL CONTINUOUS
Status: DISCONTINUED | OUTPATIENT
Start: 2022-12-18 | End: 2022-12-20 | Stop reason: HOSPADM

## 2022-12-18 RX ADMIN — BUPIVACAINE HYDROCHLORIDE 4 ML: 2.5 INJECTION, SOLUTION EPIDURAL; INFILTRATION; INTRACAUDAL; PERINEURAL at 22:43

## 2022-12-18 RX ADMIN — FENTANYL CITRATE 100 MCG: 50 INJECTION INTRAMUSCULAR; INTRAVENOUS at 18:30

## 2022-12-18 RX ADMIN — ROPIVACAINE HYDROCHLORIDE: 2 INJECTION, SOLUTION EPIDURAL; INFILTRATION at 22:48

## 2022-12-18 RX ADMIN — OXYTOCIN 2 MILLI-UNITS/MIN: 10 INJECTION, SOLUTION INTRAMUSCULAR; INTRAVENOUS at 13:31

## 2022-12-18 RX ADMIN — SODIUM CHLORIDE, POTASSIUM CHLORIDE, SODIUM LACTATE AND CALCIUM CHLORIDE: 600; 310; 30; 20 INJECTION, SOLUTION INTRAVENOUS at 23:01

## 2022-12-18 RX ADMIN — FENTANYL CITRATE 100 MCG: 50 INJECTION INTRAMUSCULAR; INTRAVENOUS at 20:58

## 2022-12-18 RX ADMIN — SODIUM CHLORIDE, POTASSIUM CHLORIDE, SODIUM LACTATE AND CALCIUM CHLORIDE: 600; 310; 30; 20 INJECTION, SOLUTION INTRAVENOUS at 13:30

## 2022-12-18 RX ADMIN — LIDOCAINE HYDROCHLORIDE,EPINEPHRINE BITARTRATE 5 ML: 15; .005 INJECTION, SOLUTION EPIDURAL; INFILTRATION; INTRACAUDAL; PERINEURAL at 22:43

## 2022-12-18 RX ADMIN — FENTANYL CITRATE 100 MCG: 50 INJECTION, SOLUTION INTRAMUSCULAR; INTRAVENOUS at 22:43

## 2022-12-18 ASSESSMENT — PAIN DESCRIPTION - PAIN TYPE: TYPE: ACUTE PAIN

## 2022-12-18 NOTE — PROGRESS NOTES
Obstetrics & Gynecology Labor Progress Note    Date of Service  2022    Chief Complaint  22 y.o. female  admitted 2022 for IOL at 40+2 weeks A    Hospital Course    Pt had cervidil placed last night      Interval Problem Update  Pt is having ctx q 5-7 minutes  FHR tracing reveals moderate variability with accels, basline 145      Code Status  Full code    CVX per RN last exam:  1cm/50/-3/soft/mid    O+  GBS+      Fluids  No intake or output data in the 24 hours ending 22 0629    Laboratory  Recent Labs     22  2300   WBC 9.6   RBC 3.63*   HEMOGLOBIN 10.5*   HEMATOCRIT 32.2*   MCV 88.7   MCH 28.9   MCHC 32.6*   RDW 46.3   PLATELETCT 314   MPV 11.9                   Assessment/Plan  Primip admitted for postdates IOL at 40+2  Cervidil placed last night  GBS neg    Anticipate   VTE prophylaxis pt is mobile, plexipulse if restricted to bed

## 2022-12-18 NOTE — PROGRESS NOTES
Pt comfortable  Cx 1/60/-2 cervadil was removed @ 1100  FHTs reactive, reassuring, category I  CTXs irregular  R/B/A to cervical ripening with balloon discussed in detail and all ?S answered. She desires to proceed with placement  Cx 1/60/-2  Cook cervical ripening balloon (with stylet) placed manually and 60 cc in each balloon placed. Exam confirms appropriate placement  Will wait one hour and then start Pitocin induction  Pt is in agreement with management plan  Will follow    [employer]

## 2022-12-18 NOTE — H&P
Admission History and Physical      Ana Gomez is a 22 y.o. female  at 40w2d who presents for elective IOL at term.     Subjective:   positive - irregular  For CTXS.   positive Feels pain   negative for LOF  negative for vaginal bleeding.   positive for fetal movement      ROS: Pertinent items are noted in HPI.    No past medical history on file.  No past surgical history on file.  OB History    Para Term  AB Living   1             SAB IAB Ectopic Molar Multiple Live Births                    # Outcome Date GA Lbr Tarun/2nd Weight Sex Delivery Anes PTL Lv   1 Current              Social History     Socioeconomic History    Marital status:      Spouse name: Not on file    Number of children: Not on file    Years of education: Not on file    Highest education level: Not on file   Occupational History    Not on file   Tobacco Use    Smoking status: Never    Smokeless tobacco: Never   Vaping Use    Vaping Use: Never used   Substance and Sexual Activity    Alcohol use: Not Currently    Drug use: Never    Sexual activity: Yes     Partners: Male   Other Topics Concern    Not on file   Social History Narrative    Not on file     Social Determinants of Health     Financial Resource Strain: Not on file   Food Insecurity: Not on file   Transportation Needs: Not on file   Physical Activity: Not on file   Stress: Not on file   Social Connections: Not on file   Intimate Partner Violence: Not on file   Housing Stability: Not on file     Allergies: Patient has no known allergies.    Current Facility-Administered Medications:     LR infusion, , Intravenous, Continuous, LAUREL Shipman    lidocaine (XYLOCAINE) 1%  injection, 20 mL, Subcutaneous, Once PRN, LAUREL Shipman    terbutaline (BRETHINE) injection 0.25 mg, 0.25 mg, Subcutaneous, Once PRN, LAUREL Shipman    oxytocin (PITOCIN) infusion (for post delivery), 500 mL, Intravenous, Once **FOLLOWED BY** oxytocin (PITOCIN)  infusion (for post delivery), 125 mL/hr, Intravenous, Continuous, Jesseniaia Joceline, OGNP    oxytocin (PITOCIN) injection 10 Units, 10 Units, Intramuscular, Once PRN, Jesseniaia Joceline, OGNP    ibuprofen (MOTRIN) tablet 800 mg, 800 mg, Oral, Once PRN, Jelenassia Joceline, OGNP    acetaminophen (TYLENOL) tablet 1,000 mg, 1,000 mg, Oral, Once PRN, Jelenassia Joceline, OGNP    fentaNYL (SUBLIMAZE) injection 50 mcg, 50 mcg, Intravenous, Q HOUR PRN **OR** fentaNYL (SUBLIMAZE) injection 100 mcg, 100 mcg, Intravenous, Q HOUR PRN, Jesseniaia Joceline, OGNP    terbutaline (BRETHINE) injection 0.25 mg, 0.25 mg, Subcutaneous, Once PRN, Alexey Car, OGNP    dinoprostone (Cervidil) vaginal insert 10 mg, 10 mg, Vaginal, Once, Jesseniaia Joceline, ERICHNP    ondansetron (ZOFRAN ODT) dispertab 4 mg, 4 mg, Oral, Q6HRS PRN **OR** ondansetron (ZOFRAN) syringe/vial injection 4 mg, 4 mg, Intravenous, Q6HRS PRN, Alexey Car, OGNP    hydrOXYzine HCl (ATARAX) tablet 50 mg, 50 mg, Oral, Q6HRS PRN, Alexey Car, OGNP    Prenatal care with [facility] starting at 12 weeks with following problems:  Patient Active Problem List    Diagnosis Date Noted    Labor and delivery indication for care or intervention 12/17/2022    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     COMPARISON:  Obstetrical ultrasound 8/15/2022     FINDINGS:  Fetal Lie:  Vertex  LMP:  3/10/2022  Clinical TRISTEN by LMP:  12/15/2022     Placenta (Location):  Posterior  Placenta Previa: No     Cervical Length:  4.35 cm transvaginal     No maternal adnexal mass is identified.     TRISTEN by [facility] US:  12/13/2022     Comments:     IMPRESSION:     Single intrauterine pregnancy  in vertex presentation.     No evidence of placenta previa. Placenta is located 3 cm from the cervix and is located posteriorly.        Objective:      Wt 92.5 kg (204 lb)     General:   no acute distress, alert, cooperative   Skin:   normal   HEENT:  PERRLA   Lungs:   CTA bilateral   Heart:   S1, S2 normal, no murmur, click, rub or gallop, regular rate and rhythm, peripheral pulses very brisk, chest is clear without rales or wheezing, no pedal edema   Abdomen:   gravid, NT   EFW:  3400g   Pelvis:  adequate, diagnonal conjugate not assessed   FHT:  140 BPM   Uterine Size: S=D   Presentations: Cephalic   Cervix:  Per RN    Dilation: 1cm    Effacement: 50%    Station:  -3    Consistency: Soft    Position: Middle   Contractions every 3-9 minutes mild      Lab Review  Lab:   Blood type: O     Recent Results (from the past 5880 hour(s))   POCT Pregnancy    Collection Time: 05/17/22  3:05 PM   Result Value Ref Range    POC Urine Pregnancy Test positive Negative    Internal Control Positive Positive     Internal Control Negative Positive    POCT Urinalysis    Collection Time: 06/06/22  9:05 AM   Result Value Ref Range    POC Color      POC Appearance      POC Leukocyte Esterase negative Negative    POC Nitrites negative Negative    POC Urobiligen      POC Protein negative Negative mg/dL    POC Urine PH 5.0 5.0 - 8.0    POC Blood moderate Negative    POC Specific Gravity >=1.030 <1.005 - >1.030    POC Ketones negative Negative mg/dL    POC Bilirubin      POC Glucose negative Negative mg/dL   THINPREP RFLX HPV ASC AND ABOVE W/CTNG    Collection Time: 06/06/22  9:22 AM   Result Value Ref Range    Cytology Reg See Path Report     C. trachomatis by PCR Negative Negative    N. gonorrhoeae by PCR Negative Negative    Source Cervical    HPV by PCR Assoc. w/Thinprep    Collection Time: 06/06/22  9:22 AM   Result Value Ref Range    Source Cervical     HPV Genotype 16 Negative Negative    HPV Genotype 18 Negative Negative    HPV  Other High Risk Genotypes POSITIVE (A) Negative   URINE DRUG SCREEN    Collection Time: 06/06/22  9:39 AM   Result Value Ref Range    Amphetamines Urine Negative Negative    Barbiturates Negative Negative    Benzodiazepines Negative Negative    Cocaine Metabolite Negative Negative    Methadone Negative Negative    Opiates Negative Negative    Oxycodone Negative Negative    Phencyclidine -Pcp Negative Negative    Propoxyphene Negative Negative    Cannabinoid Metab Negative Negative   PREGNANCY CENTER STANDARD PRENATAL PANEL    Collection Time: 06/06/22  9:39 AM   Result Value Ref Range    Color Yellow     Character Cloudy (A)     Specific Gravity >=1.030 <1.035    Ph 5.5 5.0 - 8.0    Glucose Negative Negative mg/dL    Ketones Negative Negative mg/dL    Protein Negative Negative mg/dL    Bilirubin Negative Negative    Urobilinogen, Urine 0.2 Negative    Nitrite Negative Negative    Leukocyte Esterase Negative Negative    Occult Blood Small (A) Negative    Micro Urine Req Microscopic     WBC 10.2 4.8 - 10.8 K/uL    RBC 4.10 (L) 4.20 - 5.40 M/uL    Hemoglobin 12.7 12.0 - 16.0 g/dL    Hematocrit 38.7 37.0 - 47.0 %    MCV 94.4 81.4 - 97.8 fL    MCH 31.0 27.0 - 33.0 pg    MCHC 32.8 (L) 33.6 - 35.0 g/dL    RDW 47.0 35.9 - 50.0 fL    Platelet Count 224 164 - 446 K/uL    MPV 13.1 (H) 9.0 - 12.9 fL    Neutrophils-Polys 65.30 44.00 - 72.00 %    Lymphocytes 26.20 22.00 - 41.00 %    Monocytes 6.10 0.00 - 13.40 %    Eosinophils 1.50 0.00 - 6.90 %    Basophils 0.50 0.00 - 1.80 %    Immature Granulocytes 0.40 0.00 - 0.90 %    Nucleated RBC 0.00 /100 WBC    Neutrophils (Absolute) 6.65 2.00 - 7.15 K/uL    Lymphs (Absolute) 2.67 1.00 - 4.80 K/uL    Monos (Absolute) 0.62 0.00 - 0.85 K/uL    Eos (Absolute) 0.15 0.00 - 0.51 K/uL    Baso (Absolute) 0.05 0.00 - 0.12 K/uL    Immature Granulocytes (abs) 0.04 0.00 - 0.11 K/uL    NRBC (Absolute) 0.00 K/uL    Hepatitis B Surface Antigen Non-Reactive Non-Reactive    Rubella IgG Antibody 92.50  IU/mL    Syphilis, Treponemal Qual Non-Reactive Non-Reactive   HIV AG/AB COMBO ASSAY SCREENING    Collection Time: 06/06/22  9:39 AM   Result Value Ref Range    HIV Ag/Ab Combo Assay Non-Reactive Non Reactive   URINE MICROSCOPIC (W/UA)    Collection Time: 06/06/22  9:39 AM   Result Value Ref Range    WBC 0-2 /hpf    RBC 0-2 /hpf    Bacteria Few (A) None /hpf    Epithelial Cells Rare /hpf    Amorphous Crystal Present /hpf    Hyaline Cast 0-2 /lpf   OP Prenatal Panel-Blood Bank    Collection Time: 06/06/22  9:43 AM   Result Value Ref Range    ABO Grouping Only O     Rh Grouping Only POS     Antibody Screen Scrn NEG    T.PALLIDUM AB EIA    Collection Time: 09/20/22  9:20 AM   Result Value Ref Range    Syphilis, Treponemal Qual Non-Reactive Non-Reactive   CBC WITHOUT DIFFERENTIAL    Collection Time: 09/20/22  9:20 AM   Result Value Ref Range    WBC 11.8 (H) 4.8 - 10.8 K/uL    RBC 3.42 (L) 4.20 - 5.40 M/uL    Hemoglobin 11.3 (L) 12.0 - 16.0 g/dL    Hematocrit 32.8 (L) 37.0 - 47.0 %    MCV 95.9 81.4 - 97.8 fL    MCH 33.0 27.0 - 33.0 pg    MCHC 34.5 33.6 - 35.0 g/dL    RDW 48.1 35.9 - 50.0 fL    Platelet Count 275 164 - 446 K/uL    MPV 12.1 9.0 - 12.9 fL   GLUCOSE 1HR GESTATIONAL    Collection Time: 09/20/22  9:20 AM   Result Value Ref Range    Glucose, Post Dose 154 (H) 70 - 139 mg/dL   GLUCOSE 3 HR GESTATIONAL    Collection Time: 10/07/22 10:30 AM   Result Value Ref Range    Baseline Glucose 75 65 - 95 mg/dL    Glucose 1 Hour 203 (H) 65 - 180 mg/dL    Glucose 2 Hour 135 65 - 155 mg/dL    Glucose 3 Hour 115 65 - 140 mg/dL   URINE CULTURE(NEW)    Collection Time: 10/07/22 10:54 AM    Specimen: Urine   Result Value Ref Range    Significant Indicator NEG     Source UR     Site -     Culture Result Usual urogenital harjit >100,000 cfu/mL    GRP B STREP, BY PCR (CORTES BROTH)    Collection Time: 11/21/22  8:09 AM    Specimen: Genital   Result Value Ref Range    Strep Gp B DNA PCR Negative Negative          Assessment:   Ana  Alonso Gomez at 40w2d  Labor status: Not in labor.  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      Plan:     1. Admit to L&D  2. GBS negative   3. Desires nothing for pain relief. Patient was counseled regarding IOL in general and expectations related to pain and her options   4. Plan at this time is cervidil . Consider AROM for augmentation if appropriate.       CFeaster JAZMINE/ Dr. Hilario Attending

## 2022-12-19 PROCEDURE — 700105 HCHG RX REV CODE 258: Performed by: OBSTETRICS & GYNECOLOGY

## 2022-12-19 PROCEDURE — A9270 NON-COVERED ITEM OR SERVICE: HCPCS | Performed by: ADVANCED PRACTICE MIDWIFE

## 2022-12-19 PROCEDURE — 3E033VJ INTRODUCTION OF OTHER HORMONE INTO PERIPHERAL VEIN, PERCUTANEOUS APPROACH: ICD-10-PCS | Performed by: OBSTETRICS & GYNECOLOGY

## 2022-12-19 PROCEDURE — 700111 HCHG RX REV CODE 636 W/ 250 OVERRIDE (IP): Performed by: ADVANCED PRACTICE MIDWIFE

## 2022-12-19 PROCEDURE — 59409 OBSTETRICAL CARE: CPT

## 2022-12-19 PROCEDURE — 59409 OBSTETRICAL CARE: CPT | Performed by: OBSTETRICS & GYNECOLOGY

## 2022-12-19 PROCEDURE — 700111 HCHG RX REV CODE 636 W/ 250 OVERRIDE (IP): Performed by: OBSTETRICS & GYNECOLOGY

## 2022-12-19 PROCEDURE — 770002 HCHG ROOM/CARE - OB PRIVATE (112)

## 2022-12-19 PROCEDURE — A9270 NON-COVERED ITEM OR SERVICE: HCPCS

## 2022-12-19 PROCEDURE — 700105 HCHG RX REV CODE 258: Performed by: ADVANCED PRACTICE MIDWIFE

## 2022-12-19 PROCEDURE — 304965 HCHG RECOVERY SERVICES

## 2022-12-19 PROCEDURE — 3E0P7VZ INTRODUCTION OF HORMONE INTO FEMALE REPRODUCTIVE, VIA NATURAL OR ARTIFICIAL OPENING: ICD-10-PCS | Performed by: OBSTETRICS & GYNECOLOGY

## 2022-12-19 PROCEDURE — 700102 HCHG RX REV CODE 250 W/ 637 OVERRIDE(OP): Performed by: ADVANCED PRACTICE MIDWIFE

## 2022-12-19 PROCEDURE — 700111 HCHG RX REV CODE 636 W/ 250 OVERRIDE (IP)

## 2022-12-19 PROCEDURE — 700105 HCHG RX REV CODE 258: Performed by: ANESTHESIOLOGY

## 2022-12-19 PROCEDURE — 0KQM0ZZ REPAIR PERINEUM MUSCLE, OPEN APPROACH: ICD-10-PCS | Performed by: OBSTETRICS & GYNECOLOGY

## 2022-12-19 PROCEDURE — 700102 HCHG RX REV CODE 250 W/ 637 OVERRIDE(OP)

## 2022-12-19 PROCEDURE — 700111 HCHG RX REV CODE 636 W/ 250 OVERRIDE (IP): Performed by: INTERNAL MEDICINE

## 2022-12-19 PROCEDURE — A9270 NON-COVERED ITEM OR SERVICE: HCPCS | Performed by: OBSTETRICS & GYNECOLOGY

## 2022-12-19 PROCEDURE — 700102 HCHG RX REV CODE 250 W/ 637 OVERRIDE(OP): Performed by: OBSTETRICS & GYNECOLOGY

## 2022-12-19 PROCEDURE — 700105 HCHG RX REV CODE 258: Performed by: PHYSICIAN ASSISTANT

## 2022-12-19 RX ORDER — MISOPROSTOL 200 UG/1
800 TABLET ORAL
Status: CANCELLED | OUTPATIENT
Start: 2022-12-19

## 2022-12-19 RX ORDER — SODIUM CHLORIDE, SODIUM LACTATE, POTASSIUM CHLORIDE, CALCIUM CHLORIDE 600; 310; 30; 20 MG/100ML; MG/100ML; MG/100ML; MG/100ML
INJECTION, SOLUTION INTRAVENOUS CONTINUOUS
Status: DISCONTINUED | OUTPATIENT
Start: 2022-12-19 | End: 2022-12-20 | Stop reason: HOSPADM

## 2022-12-19 RX ORDER — CITRIC ACID/SODIUM CITRATE 334-500MG
30 SOLUTION, ORAL ORAL ONCE
Status: DISCONTINUED | OUTPATIENT
Start: 2022-12-19 | End: 2022-12-19

## 2022-12-19 RX ORDER — CEFAZOLIN SODIUM 1 G/3ML
2 INJECTION, POWDER, FOR SOLUTION INTRAMUSCULAR; INTRAVENOUS ONCE
Status: DISCONTINUED | OUTPATIENT
Start: 2022-12-19 | End: 2022-12-19

## 2022-12-19 RX ORDER — OXYCODONE HYDROCHLORIDE 5 MG/1
5 TABLET ORAL EVERY 4 HOURS PRN
Status: DISCONTINUED | OUTPATIENT
Start: 2022-12-19 | End: 2022-12-20 | Stop reason: HOSPADM

## 2022-12-19 RX ORDER — OXYTOCIN 10 [USP'U]/ML
10 INJECTION, SOLUTION INTRAMUSCULAR; INTRAVENOUS
Status: CANCELLED | OUTPATIENT
Start: 2022-12-19

## 2022-12-19 RX ORDER — METHYLERGONOVINE MALEATE 0.2 MG/ML
0.2 INJECTION INTRAVENOUS ONCE
Status: COMPLETED | OUTPATIENT
Start: 2022-12-19 | End: 2022-12-19

## 2022-12-19 RX ORDER — DOCUSATE SODIUM 100 MG/1
100 CAPSULE, LIQUID FILLED ORAL 2 TIMES DAILY PRN
Status: DISCONTINUED | OUTPATIENT
Start: 2022-12-19 | End: 2022-12-20 | Stop reason: HOSPADM

## 2022-12-19 RX ORDER — SODIUM CHLORIDE, SODIUM LACTATE, POTASSIUM CHLORIDE, CALCIUM CHLORIDE 600; 310; 30; 20 MG/100ML; MG/100ML; MG/100ML; MG/100ML
INJECTION, SOLUTION INTRAVENOUS ONCE
Status: CANCELLED | OUTPATIENT
Start: 2022-12-19 | End: 2022-12-19

## 2022-12-19 RX ORDER — ACETAMINOPHEN 500 MG
1000 TABLET ORAL EVERY 6 HOURS PRN
Status: DISCONTINUED | OUTPATIENT
Start: 2022-12-19 | End: 2022-12-20 | Stop reason: HOSPADM

## 2022-12-19 RX ORDER — DEXTROSE, SODIUM CHLORIDE, SODIUM LACTATE, POTASSIUM CHLORIDE, AND CALCIUM CHLORIDE 5; .6; .31; .03; .02 G/100ML; G/100ML; G/100ML; G/100ML; G/100ML
INJECTION, SOLUTION INTRAVENOUS CONTINUOUS
Status: DISCONTINUED | OUTPATIENT
Start: 2022-12-19 | End: 2022-12-20 | Stop reason: HOSPADM

## 2022-12-19 RX ORDER — SODIUM CHLORIDE, SODIUM GLUCONATE, SODIUM ACETATE, POTASSIUM CHLORIDE AND MAGNESIUM CHLORIDE 526; 502; 368; 37; 30 MG/100ML; MG/100ML; MG/100ML; MG/100ML; MG/100ML
1000 INJECTION, SOLUTION INTRAVENOUS ONCE
Status: COMPLETED | OUTPATIENT
Start: 2022-12-19 | End: 2022-12-19

## 2022-12-19 RX ORDER — METOCLOPRAMIDE HYDROCHLORIDE 5 MG/ML
10 INJECTION INTRAMUSCULAR; INTRAVENOUS ONCE
Status: DISCONTINUED | OUTPATIENT
Start: 2022-12-19 | End: 2022-12-19

## 2022-12-19 RX ORDER — SODIUM CHLORIDE, SODIUM LACTATE, POTASSIUM CHLORIDE, CALCIUM CHLORIDE 600; 310; 30; 20 MG/100ML; MG/100ML; MG/100ML; MG/100ML
INJECTION, SOLUTION INTRAVENOUS PRN
Status: DISCONTINUED | OUTPATIENT
Start: 2022-12-19 | End: 2022-12-20 | Stop reason: HOSPADM

## 2022-12-19 RX ORDER — AZITHROMYCIN 500 MG/5ML
500 INJECTION, POWDER, LYOPHILIZED, FOR SOLUTION INTRAVENOUS ONCE
Status: DISCONTINUED | OUTPATIENT
Start: 2022-12-19 | End: 2022-12-19

## 2022-12-19 RX ORDER — MISOPROSTOL 200 UG/1
600 TABLET ORAL
Status: DISCONTINUED | OUTPATIENT
Start: 2022-12-19 | End: 2022-12-20 | Stop reason: HOSPADM

## 2022-12-19 RX ORDER — MISOPROSTOL 200 UG/1
TABLET ORAL
Status: COMPLETED
Start: 2022-12-19 | End: 2022-12-19

## 2022-12-19 RX ORDER — METHYLERGONOVINE MALEATE 0.2 MG/ML
0.2 INJECTION INTRAVENOUS
Status: CANCELLED | OUTPATIENT
Start: 2022-12-19

## 2022-12-19 RX ORDER — VITAMIN A ACETATE, BETA CAROTENE, ASCORBIC ACID, CHOLECALCIFEROL, .ALPHA.-TOCOPHEROL ACETATE, DL-, THIAMINE MONONITRATE, RIBOFLAVIN, NIACINAMIDE, PYRIDOXINE HYDROCHLORIDE, FOLIC ACID, CYANOCOBALAMIN, CALCIUM CARBONATE, FERROUS FUMARATE, ZINC OXIDE, CUPRIC OXIDE 3080; 12; 120; 400; 1; 1.84; 3; 20; 22; 920; 25; 200; 27; 10; 2 [IU]/1; UG/1; MG/1; [IU]/1; MG/1; MG/1; MG/1; MG/1; MG/1; [IU]/1; MG/1; MG/1; MG/1; MG/1; MG/1
1 TABLET, FILM COATED ORAL
Status: DISCONTINUED | OUTPATIENT
Start: 2022-12-19 | End: 2022-12-20 | Stop reason: HOSPADM

## 2022-12-19 RX ORDER — METHYLERGONOVINE MALEATE 0.2 MG/ML
INJECTION INTRAVENOUS
Status: COMPLETED
Start: 2022-12-19 | End: 2022-12-19

## 2022-12-19 RX ORDER — SODIUM CHLORIDE, SODIUM GLUCONATE, SODIUM ACETATE, POTASSIUM CHLORIDE AND MAGNESIUM CHLORIDE 526; 502; 368; 37; 30 MG/100ML; MG/100ML; MG/100ML; MG/100ML; MG/100ML
1000 INJECTION, SOLUTION INTRAVENOUS ONCE
Status: DISCONTINUED | OUTPATIENT
Start: 2022-12-19 | End: 2022-12-19

## 2022-12-19 RX ORDER — METHYLERGONOVINE MALEATE 0.2 MG/ML
0.2 INJECTION INTRAVENOUS
Status: DISCONTINUED | OUTPATIENT
Start: 2022-12-19 | End: 2022-12-20 | Stop reason: HOSPADM

## 2022-12-19 RX ORDER — IBUPROFEN 800 MG/1
800 TABLET ORAL EVERY 8 HOURS PRN
Status: DISCONTINUED | OUTPATIENT
Start: 2022-12-19 | End: 2022-12-20 | Stop reason: HOSPADM

## 2022-12-19 RX ORDER — MISOPROSTOL 200 UG/1
800 TABLET ORAL ONCE
Status: COMPLETED | OUTPATIENT
Start: 2022-12-19 | End: 2022-12-19

## 2022-12-19 RX ADMIN — ACETAMINOPHEN 1000 MG: 500 TABLET ORAL at 03:23

## 2022-12-19 RX ADMIN — MISOPROSTOL 800 MCG: 200 TABLET ORAL at 12:33

## 2022-12-19 RX ADMIN — OXYTOCIN 125 ML/HR: 10 INJECTION, SOLUTION INTRAMUSCULAR; INTRAVENOUS at 13:12

## 2022-12-19 RX ADMIN — OXYTOCIN 7 MILLI-UNITS/MIN: 10 INJECTION, SOLUTION INTRAMUSCULAR; INTRAVENOUS at 09:02

## 2022-12-19 RX ADMIN — SODIUM CHLORIDE, SODIUM GLUCONATE, SODIUM ACETATE, POTASSIUM CHLORIDE AND MAGNESIUM CHLORIDE 1000 ML: 526; 502; 368; 37; 30 INJECTION, SOLUTION INTRAVENOUS at 09:45

## 2022-12-19 RX ADMIN — ACETAMINOPHEN 1000 MG: 500 TABLET ORAL at 14:14

## 2022-12-19 RX ADMIN — METHYLERGONOVINE MALEATE 0.2 MG: 0.2 INJECTION, SOLUTION INTRAMUSCULAR; INTRAVENOUS at 12:20

## 2022-12-19 RX ADMIN — ROPIVACAINE HYDROCHLORIDE: 2 INJECTION, SOLUTION EPIDURAL; INFILTRATION at 08:10

## 2022-12-19 RX ADMIN — METHYLERGONOVINE MALEATE 0.2 MG: 0.2 INJECTION INTRAVENOUS at 12:20

## 2022-12-19 RX ADMIN — SODIUM CHLORIDE, SODIUM LACTATE, POTASSIUM CHLORIDE, CALCIUM CHLORIDE AND DEXTROSE MONOHYDRATE: 5; 600; 310; 30; 20 INJECTION, SOLUTION INTRAVENOUS at 06:46

## 2022-12-19 ASSESSMENT — PAIN DESCRIPTION - PAIN TYPE: TYPE: ACUTE PAIN

## 2022-12-19 ASSESSMENT — PAIN SCALES - GENERAL: PAIN_LEVEL: 5

## 2022-12-19 NOTE — PROGRESS NOTES
0700: Report received from KRISTIAN Wise. Assumed care of pt.  1130: Dr Del Angel at bedside, Cervidil removed. Pt unable to tolerate SVE by David LOZANO RN called to bedside for SVE. SVE as charted.  1225: Cooks balloon placed by Dr. Ferro. Orders to start pitocin within an hour of balloon placement.  1331: Pitocin started per protocol.  1430: Pt called RN c/o of left wrist pain. IV infiltrated. IV removed and replaced.  1708: SROM with clear fluid, balloon fell out. Marce CORTEZ RN, Dr. Del Angel and Dr. Ferro at bedside. SVE as charted.  1830: Pt called out requesting IV pain med. See MAR.  1900: Pt questioning about epidural, procedure explained by RN. Report given to KRISTIAN Ruiz.

## 2022-12-19 NOTE — PROGRESS NOTES
0700: Receive report from Sara TOWNSEND, plan of care discussed. RN in room to reposition during a prolonged decel. Call light in reach, pt encouraged to use when in need of assistance.     0835: Dr. Mcallister at bedside, decision to convert to a  section. Plan to prep pt.     0849: Dr. Mcallister at bedside for SVE, Lip that was reduced, plan to hold off on c/s and attempt pushing.     1213: Spontaneous vaginal delivery of viable male infant, RN had called RT and NICU at bedside for delivery on stand by.     1500: Pt up to toilet to void, pt transferred safely up to PP with viable male infant in arms. Report given to Fatmata TOWNSEND, plan of care discussed. IV unable to flush after transfer, Pit left in beg to infuse. Call made to Dr. Mcallister to inform about IV and left over pit, orders to hold on starting new iv to infuse rest of bag, have RN monitor bleeding and if  bleeding increased and notify MD or RN.

## 2022-12-19 NOTE — ANESTHESIA TIME REPORT
Anesthesia Start and Stop Event Times     Date Time Event    12/18/2022 2236 Ready for Procedure     2236 Anesthesia Start    12/19/2022 1213 Anesthesia Stop        Responsible Staff  12/18/22 to 12/19/22    Name Role Begin End    Liborio Valdez M.D. Anesth 2236 0700    Morro Posadas M.D. Anesth 0700 1213        Overtime Reason:  no overtime (within assigned shift)    Comments:

## 2022-12-19 NOTE — PROGRESS NOTES
Pt reports large gush of fluid  Exam confirms SROM with clear fluid  SVE reveals that both balloons are now in the vagina and they are deflated and removed. Cx 1-2/ 70/-2  IUPC placed  CTXs q 2 minutes  FHTS reactive, reassuring, category I  Pitocin @ 8mu  Will follow    awestfall

## 2022-12-19 NOTE — CARE PLAN
The patient is Stable - Low risk of patient condition declining or worsening      Problem: Psychosocial - L&D  Goal: Spiritual and cultural needs incorporated into hospitalization  Outcome: Progressing     Problem: Risk for Infection and Impaired Wound Healing  Goal: Patient will remain free from infection  Outcome: Progressing     Problem: Pain  Goal: Patient's pain will be alleviated or reduced to the patient’s comfort goal  Outcome: Progressing

## 2022-12-19 NOTE — PROGRESS NOTES
- Report received from KRISTIAN Young. , 40/3, admitted for elective IOL. Patient and FOB are Korean speaking only, there is a  iPad in the room. POC discussed.      - SVE /0.    2141 - Patient requesting epidural.     - Dr. Valdez notified.      - Patient sitting up for epidural.      - Dr. Valdez at bedside. Patient educated and consented for epidural using .      - Time out completed. All agree.     - Test dose given without complications.     230 - Patient resting comfortably.    2345 - SVE 3-/0.    0040 - Report given to JAZMINE Bourgeois regarding fetal heart tones and contractions. Orders received to begin amnioinfusion, 300mL bolus and 150mL continuous providing good fluid return.     0050 - Patient educated on amnioinfusion via iPad . Amnioinfusion started.     0138 - Patient requesting SVE. SVE 4-/0.    0227 - Report given to JAZMINE Bourgeois. No new orders at this time.    0255 - JAZMINE Bourgeois at bedside. SVE unchanged per MD.     0500 - SVE unchanged. JAZMINE Bourgeois made aware. Orders received to continue position changes and increase pitocin.     0555 - Strip reviewed by provider.    0605 - JAZMINE Bourgeois at bedside. SVE /0.     0615 - Orders received to start D5LR.    0700 - Report given to Arlette VEGA RN.

## 2022-12-19 NOTE — ANESTHESIA PROCEDURE NOTES
Epidural Block    Date/Time: 12/18/2022 10:43 PM  Performed by: Liborio Valdez M.D.  Authorized by: Liborio Valdez M.D.     Patient Location:  OB  Start Time:  12/18/2022 10:43 PM  End Time:  12/18/2022 10:47 PM  Reason for Block: labor analgesia    patient identified, IV checked, site marked, risks and benefits discussed, surgical consent, monitors and equipment checked, pre-op evaluation and timeout performed    Patient Position:  Sitting  Prep: ChloraPrep, patient draped and sterile technique    Monitoring:  Blood pressure, continuous pulse oximetry and heart rate  Approach:  Midline  Location:  L3-L4  Injection Technique:  KIRAN air and KIRAN saline  Skin infiltration:  Lidocaine  Strength:  1%  Dose:  3ml  Needle Type:  Tuohy  Needle Gauge:  17 G  Needle Length:  3.5 in  Loss of resistance::  7  Catheter Size:  19 G  Catheter at Skin Depth:  12  Test Dose Result:  Negative   Single pass, easy KIRAN with negative aspiration. Negative test dose and aspiration via catheter. Patient comfortable after bolus, no complications.

## 2022-12-19 NOTE — CARE PLAN
The patient is Stable - Low risk of patient condition declining or worsening    Shift Goals  Clinical Goals: cervical change, category 1 fetal heart tracing  Patient Goals: healthy mom, healthy baby  Family Goals: support    Progress made toward(s) clinical / shift goals:    Problem: Knowledge Deficit - L&D  Goal: Patient and family/caregivers will demonstrate understanding of plan of care, disease process/condition, diagnostic tests and medications  Outcome: Progressing     Problem: Risk for Excess Fluid Volume  Goal: Patient will demonstrate pulse, blood pressure and neurologic signs within expected ranges and without any respiratory complications  Outcome: Progressing     Problem: Psychosocial - L&D  Goal: Patient's level of anxiety will decrease  Outcome: Progressing  Goal: Patient will be able to discuss coping skills during hospitalization  Outcome: Progressing  Goal: Spiritual and cultural needs incorporated into hospitalization  Outcome: Progressing     Problem: Risk for Injury  Goal: Patient and fetus will be free of preventable injury/complications  Outcome: Progressing

## 2022-12-19 NOTE — ANESTHESIA POSTPROCEDURE EVALUATION
Patient: Ana Gomez    Procedure Summary     Date: 12/18/22 Room / Location:     Anesthesia Start: 2236 Anesthesia Stop: 12/19/22 1213    Procedure: Labor Epidural Diagnosis:     Scheduled Providers:  Responsible Provider: Morro Posadas M.D.    Anesthesia Type: epidural ASA Status: 2          Final Anesthesia Type: epidural  Last vitals  BP   Blood Pressure: 118/61    Temp   37.1 °C (98.7 °F)    Pulse   77   Resp   17    SpO2   97 %      Anesthesia Post Evaluation    Patient location during evaluation: floor  Patient participation: complete - patient participated  Level of consciousness: awake and alert  Pain score: 5    Airway patency: patent  Anesthetic complications: no  Cardiovascular status: hemodynamically stable  Respiratory status: acceptable  Hydration status: euvolemic    PONV: none          No notable events documented.

## 2022-12-19 NOTE — PROGRESS NOTES
Pt up from L&D via wheelchair. Assessment complete. VSS. Fundus firm, lochia light. Pt oriented to room, call light, emergency light, bulb syringe and putting baby on back to sleep. Call light within reach. Will continue to monitor.

## 2022-12-19 NOTE — ANESTHESIA PREPROCEDURE EVALUATION
Date: 12/18/22  Procedure: Labor Epidural     Patient requests Neuraxial Labor Analgesia.     Anesthesia: No problems with Anesthesia.  Resp: No asthma or COPD history.  Cards: No pre-eclampsia, or known cardiac abnormalities.  Heme: No known bleeding disorders, platelets reviewed.     Risks of procedure discussed including: infection, bleeding, nerve damage, and post-dural puncture headache.         Relevant Problems   Other   (positive) Labor and delivery indication for care or intervention       Physical Exam    Airway   Mallampati: II  TM distance: >3 FB  Neck ROM: full       Cardiovascular - normal exam  Rhythm: regular  Rate: normal  (-) murmur     Dental - normal exam           Pulmonary - normal exam  Breath sounds clear to auscultation     Abdominal    Neurological - normal exam                 Anesthesia Plan    ASA 2       Plan - epidural   Neuraxial block will be labor analgesia                  Pertinent diagnostic labs and testing reviewed    Informed Consent:    Anesthetic plan and risks discussed with patient.

## 2022-12-20 VITALS
SYSTOLIC BLOOD PRESSURE: 121 MMHG | RESPIRATION RATE: 19 BRPM | BODY MASS INDEX: 34.66 KG/M2 | HEIGHT: 65 IN | OXYGEN SATURATION: 97 % | DIASTOLIC BLOOD PRESSURE: 77 MMHG | TEMPERATURE: 98.5 F | HEART RATE: 92 BPM | WEIGHT: 208 LBS

## 2022-12-20 LAB
ERYTHROCYTE [DISTWIDTH] IN BLOOD BY AUTOMATED COUNT: 49.2 FL (ref 35.9–50)
HCT VFR BLD AUTO: 25.8 % (ref 37–47)
HGB BLD-MCNC: 8.4 G/DL (ref 12–16)
MCH RBC QN AUTO: 29.2 PG (ref 27–33)
MCHC RBC AUTO-ENTMCNC: 32.6 G/DL (ref 33.6–35)
MCV RBC AUTO: 89.6 FL (ref 81.4–97.8)
PLATELET # BLD AUTO: 235 K/UL (ref 164–446)
PMV BLD AUTO: 11.7 FL (ref 9–12.9)
RBC # BLD AUTO: 2.88 M/UL (ref 4.2–5.4)
WBC # BLD AUTO: 16 K/UL (ref 4.8–10.8)

## 2022-12-20 PROCEDURE — 700102 HCHG RX REV CODE 250 W/ 637 OVERRIDE(OP): Performed by: OBSTETRICS & GYNECOLOGY

## 2022-12-20 PROCEDURE — A9270 NON-COVERED ITEM OR SERVICE: HCPCS | Performed by: OBSTETRICS & GYNECOLOGY

## 2022-12-20 PROCEDURE — 36415 COLL VENOUS BLD VENIPUNCTURE: CPT

## 2022-12-20 PROCEDURE — 85027 COMPLETE CBC AUTOMATED: CPT

## 2022-12-20 RX ADMIN — PRENATAL WITH FERROUS FUM AND FOLIC ACID 1 TABLET: 3080; 920; 120; 400; 22; 1.84; 3; 20; 10; 1; 12; 200; 27; 25; 2 TABLET ORAL at 09:35

## 2022-12-20 RX ADMIN — IBUPROFEN 800 MG: 800 TABLET, FILM COATED ORAL at 09:35

## 2022-12-20 ASSESSMENT — EDINBURGH POSTNATAL DEPRESSION SCALE (EPDS)
I HAVE BEEN SO UNHAPPY THAT I HAVE HAD DIFFICULTY SLEEPING: NOT AT ALL
I HAVE BLAMED MYSELF UNNECESSARILY WHEN THINGS WENT WRONG: NO, NEVER
I HAVE FELT SCARED OR PANICKY FOR NO GOOD REASON: NO, NOT AT ALL
I HAVE FELT SAD OR MISERABLE: NO, NOT AT ALL
I HAVE LOOKED FORWARD WITH ENJOYMENT TO THINGS: AS MUCH AS I EVER DID
THINGS HAVE BEEN GETTING ON TOP OF ME: NO, I HAVE BEEN COPING AS WELL AS EVER
I HAVE BEEN SO UNHAPPY THAT I HAVE BEEN CRYING: NO, NEVER
I HAVE BEEN ABLE TO LAUGH AND SEE THE FUNNY SIDE OF THINGS: AS MUCH AS I ALWAYS COULD
THE THOUGHT OF HARMING MYSELF HAS OCCURRED TO ME: NEVER
I HAVE BEEN ANXIOUS OR WORRIED FOR NO GOOD REASON: YES, SOMETIMES

## 2022-12-20 ASSESSMENT — PAIN DESCRIPTION - PAIN TYPE: TYPE: ACUTE PAIN

## 2022-12-20 NOTE — LACTATION NOTE
This note was copied from a baby's chart.  Mother reports breastfeeding is going well so far, baby able to latch on both breasts, nipples are flat at rest but easily jose with stimulation. Mother tried manual breast pumping to jose nipple prior to latch but reports nipple discomfort with pump use, taught how to control suction with manual breast pump and also how to roll and stimulate nipple to jose prior to latch. Grandma has been helping latch baby, mother reports slight nipple discomfort during feeding, assisted with tummy to tummy positioning to prevent excessive pulling on nipple and mother reports increased latch comfort, nipple observed to be round post feeding when infant self-detached from breast. Reviewed milk onset, hunger cues, cluster feeding, frequency/duration of feeds, infant stool transitions. Referral sent for [facility] enrollment, also provided list of community breastfeeding resources and breastfeeding education booklet in Armenian. Plan to continue cue based breastfeeding at least 8 or more times per 24 hours. Consult with  [facility] #[id number] for Armenian language. Family denies questions/concerns.

## 2022-12-20 NOTE — DISCHARGE SUMMARY
Discharge summary;    PATIENT ID:  NAME:  Ana Gomez  MRN:               [id number]  YOB: 2000    Admission date; 2022  Discharge date; 2022     22 y.o. female  at 40w4d PPD#1 s/p     Subjective: No complaints    Objective:    Vitals:    22 1100 22 1600 22 1800 22 2200   BP: (!) 139/95 130/72 130/79 129/80   Pulse: 71 85 94 98   Resp:     Temp:  37.6 °C (99.7 °F) 37.4 °C (99.3 °F) 36.9 °C (98.5 °F)   TempSrc:  Temporal Temporal Temporal   SpO2:  96% 97% 98%   Weight:       Height:         General: No acute distress, resting comfortably in bed.  HEENT: normocephalic, nontraumatic, PERRLA, EOMI  Cardiovascular: Heart RRR with no murmurs, rubs or gallops. Distal Pulses 2+  Respiratory: symmetric chest expansion, lungs CTA bilaterally with no wheezes rales or rhonci  Abdomen: soft, mildly tender, fundus firm, +BS  Genitourinary: lochia light, denies excessive vaginal bleeding  Musculoskeletal: strength 5/5 in four extremities  Neuro: non focal with no numbness, tingling or changes in sensation    Recent Labs     22  2300 22  0341   WBC 9.6 16.0*   RBC 3.63* 2.88*   HEMOGLOBIN 10.5* 8.4*   HEMATOCRIT 32.2* 25.8*   MCV 88.7 89.6   MCH 28.9 29.2   RDW 46.3 49.2   PLATELETCT 314 235   MPV 11.9 11.7   NEUTSPOLYS 69.80  --    LYMPHOCYTES 22.00  --    MONOCYTES 6.90  --    EOSINOPHILS 0.30  --    BASOPHILS 0.30  --      No results for input(s): SODIUM, POTASSIUM, CHLORIDE, CO2, GLUCOSE, BUN, CPKTOTAL in the last 72 hours.    Current Meds:   Current Facility-Administered Medications   Medication Dose Frequency Provider Last Rate Last Admin    D5LR infusion   Continuous BEATRIS Nicole.   Stopped at 22 0846    lactated ringers (LR) infusion   Continuous Heron Mcallister M.D.   Held at 22 0900    lactated ringers infusion   PRN Heron Mcallister M.D.        docusate sodium (COLACE) capsule 100 mg  100 mg BID PRN Heron Mcallister M.D.         ibuprofen (MOTRIN) tablet 800 mg  800 mg Q8HRS PRN Heron Mcallister M.D.        acetaminophen (TYLENOL) tablet 1,000 mg  1,000 mg Q6HRS PRN Heron Mcallister M.D.   1,000 mg at 22 1414    PRN oxytocin (PITOCIN) (20 Units/1000 mL) PRN for excessive uterine bleeding - See Admin Instr  125-999 mL/hr Once PRN Heron Mcallister M.D.        miSOPROStol (CYTOTEC) tablet 600 mcg  600 mcg Once PRN Heron Mcallister M.D.        methylergonovine (METHERGINE) injection 0.2 mg  0.2 mg Once PRN Heron Mcallister M.D.        oxyCODONE immediate-release (ROXICODONE) tablet 5 mg  5 mg Q4HRS PRN Heron Mcallister M.D.        prenatal plus vitamin (STUARTNATAL 1+1) 27-1 MG tablet 1 Tablet  1 Tablet Daily-0800 Heron Mcallister M.D.        oxytocin (Pitocin) 0.02 Units/mL LR (induction of labor)  0.5-20 venita-units/min Continuous Payal Ferro M.D.   Stopped at 22 1218    ropivacaine 0.2 % (NAROPIN) injection   Continuous Liborio Valdez M.D.   Stopped at 22 1240    LR infusion   Continuous LAUREL Shipman 125 mL/hr at 22 0646 Restarted at 22 0646    oxytocin (PITOCIN) infusion (for post delivery)  125 mL/hr Continuous LAUREL Shipman   Stopped at 22 1447   Last reviewed on 2022 11:22 PM by Billie Velásquez R.N.      Assessment:  22 y.o. female  at 40w4d PPD#1 s/p  with second-degree laceration.  Patient was admitted for elective induction of labor at term underwent cervical ripening then Pitocin augmentation had epidural placed pushed slightly over 3 hours and delivered via normal spontaneous vaginal delivery second-degree laceration repaired in usual fashion patient had normal postpartum course she will be discharged home today and follow-up in 6 weeks.    Plan:   Routine care  Discharge home today      Heron Mcallister MD

## 2022-12-20 NOTE — CARE PLAN
The patient is Stable - Low risk of patient condition declining or worsening    Shift Goals  Clinical Goals: breastfeeding education; lochia WDL  Patient Goals: healthy mom, healthy baby  Family Goals: support    Progress made toward(s) clinical / shift goals:  Education done on positioning, frequency of feeds, latch, hand expression video, and how to use the hand pump. Pt states to understand and to demonstrate learning. Lochia WDL throughout the shift.      Patient is not progressing towards the following goals:

## 2022-12-20 NOTE — PROGRESS NOTES
1900: Received report from day shift RN. Greeted pt and family at the bedside. Updated whiteboard.     1935: Completed assessment. Pt denies of pain at this time. IV is saline locked. No signs of phlebitis or infiltration. POC discussed. Call light within reach.

## 2022-12-20 NOTE — DISCHARGE INSTRUCTIONS

## 2022-12-20 NOTE — PROGRESS NOTES
Assumed care. Assessment complete. VSS, Fundus firm, lochia light. Pt ambulating and voiding without difficulty. Pt would like pain medication as needed. Call light within reach. Will continue to monitor.

## 2022-12-20 NOTE — L&D DELIVERY NOTE
DATE OF SERVICE:  2022     The patient was admitted for elective induction of labor at 40+ weeks'   gestation.  She went through ripening of the cervix and then Pitocin   augmentation of labor.  The patient dilated to complete and complete with   epidural in place for analgesia.  The patient pushed for approximately 3 hours   and 20 minutes.  The position of the fetal head was noted to be right occiput   posterior while she was pushing and she did have very effective maternal   pushing efforts.  The position changed to occiput transverse and then I   performed a manual rotation of the fetal head to occiput anterior direct and   she went on to very quickly deliver via normal spontaneous vaginal delivery.    Respiratory therapy and  intensive care nurses were called to delivery   due to variable decelerations over the last hour prior to delivery while she   was pushing.  They assigned Apgar scores of 4 and 8.  There was   meconium-stained amniotic fluid as terminal meconium only.  There was a   second-degree midline laceration repaired with 3-0 Vicryl suture.  The   placenta delivered spontaneously intact with 3-vessel cord.  She did have some   postpartum hemorrhage with an EBL of 450 mL.  She was given Pitocin and IV   fluids, followed by Methergine 0.2 mg intramuscularly and Cytotec 800 mcg per   rectum.  The second-degree laceration was repaired in the usual fashion using   3-0 Vicryl suture.  Cord gases were drawn by myself.  The arterial pH was 7.12   with a base excess of -13.  Venous pH 7.22 with a base excess of -11.  Mother   and baby are doing well postpartum.        ______________________________  MD MELANIE SCOTT/KAHLIL/LORA    DD:  2022 19:23  DT:  2022 21:06    Job#:  [id number]

## 2023-07-13 NOTE — PROGRESS NOTES
Cervix-anterior lip, +1 station    Starting to push    Category 2 FHR Tracing       Enbrel Counseling:  I discussed with the patient the risks of etanercept including but not limited to myelosuppression, immunosuppression, autoimmune hepatitis, demyelinating diseases, lymphoma, and infections.  The patient understands that monitoring is required including a PPD at baseline and must alert us or the primary physician if symptoms of infection or other concerning signs are noted.
